# Patient Record
Sex: MALE | Race: WHITE
[De-identification: names, ages, dates, MRNs, and addresses within clinical notes are randomized per-mention and may not be internally consistent; named-entity substitution may affect disease eponyms.]

---

## 2018-10-03 ENCOUNTER — HOSPITAL ENCOUNTER (EMERGENCY)
Dept: HOSPITAL 36 - ER | Age: 38
Discharge: LEFT BEFORE DISCHARGE | End: 2018-10-03
Payer: MEDICAID

## 2018-10-03 DIAGNOSIS — F10.10: ICD-10-CM

## 2018-10-03 DIAGNOSIS — Z88.0: ICD-10-CM

## 2018-10-03 DIAGNOSIS — R04.0: Primary | ICD-10-CM

## 2018-10-03 PROCEDURE — Z7502: HCPCS

## 2018-10-03 NOTE — ED PHYSICIAN CHART
ED Chief Complaint/HPI





- Patient Information


Date Seen:: 10/03/18


Time Seen:: 03:16


Chief Complaint:: nose bleed


History of Present Illness:: 





37 yr old male with etoh abuse drank 2 fifths of vodka today and his nose 

started pouring blood now stopped pt states he was taking cyboxin and took 

himself off and started hitting the bottle again pt with chronic etoh here wife 

requesting to take him home


Allergies:: 


 Allergies











Allergy/AdvReac Type Severity Reaction Status Date / Time


 


amoxicillin Allergy   Verified 10/03/18 02:58











Vitals:: 


 Vital Signs - 8 hr











  10/03/18





  02:50


 


Temp 97.9 F


 





 


RR 18


 


/93


 


O2 Sat % 92














ED Review of Systems





- Review of Systems


General/Constitutional: No fever, No chills


Skin: Other (scab lt forehead)


Head: No headache


Eyes: No loss of vision


ENT: No earache


Neck: No neck pain


Cardio Vascular: No chest pain


Pulmonary: No SOB


GI: No nausea, No vomiting


G/U: No dysuria


Musculoskeletal: No bone or joint pain


Endocrine: No polyuria


Psychiatric: No depression


Hematopoietic: No bruising


Allergic/Immuno: No urticaria


Neurological: No syncope





ED Past Medical History





- Past Medical History


Past Medical History: Other (etoh abuse)





Family Medical History





- Family Member


  ** Mother


History Unknown: Yes





ED Physical Exam





- Physical Examination


General/Constitutional: Awake, Well-developed, well-nourished


Head: Atraumatic


Other ENMT comments:: 





dry blood both nares


Neck: Nontender


Respiratory: Nl effort/Exclusion


Cardio Vascular: RRR


GI: No tenderness/rebounding/guarding


Extremities: No tenderness or effusion


Neuro/Psych: Alert/oriented





ED Assessment





- Assessment


General Assessment: 





epistaxis





ED Septic Shock





- .


Is Septic Shock (SBP<90, OR Lactate>4 mmol\L) present?: No





- <6hrs of presentation:


Vital Signs: 


 Vital Signs - 8 hr











  10/03/18





  02:50


 


Temp 97.9 F


 





 


RR 18


 


/93


 


O2 Sat % 92














ED Reassessment (Disposition)





- Reassessment


Reassessment Condition:: Improved





- Diagnosis


Diagnosis:: 





epistaxis





- Aftercare/Follow up Instructions


Aftercare/Follow-Up Instructions:: Counseled pt regarding lab results/diagnosis 

& need follow up, Counseled pt & family regarding lab results/diagnosis & need 

follow up





- Patient Disposition


Discharge/Transfer:: Home


Condition at Disposition:: Stable

## 2018-10-05 ENCOUNTER — HOSPITAL ENCOUNTER (EMERGENCY)
Dept: HOSPITAL 36 - ER | Age: 38
Discharge: HOME | End: 2018-10-05
Payer: MEDICAID

## 2018-10-05 DIAGNOSIS — F19.10: ICD-10-CM

## 2018-10-05 DIAGNOSIS — Z88.0: ICD-10-CM

## 2018-10-05 DIAGNOSIS — R04.0: Primary | ICD-10-CM
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
## 2018-10-05 DIAGNOSIS — D69.6: ICD-10-CM

## 2018-10-05 DIAGNOSIS — F10.20: ICD-10-CM

## 2018-10-05 LAB
AMPHET UR-MCNC: NEGATIVE NG/ML
BARBITURATES UR-MCNC: NEGATIVE UG/ML
BASOPHILS # BLD AUTO: 0 TH/CUMM (ref 0–0.2)
BASOPHILS NFR BLD AUTO: 0.8 % (ref 0–2)
BENZODIAZEPINES PNL UR: POSITIVE
CANNABINOIDS SERPL QL CFM: NEGATIVE
COCAINE METAB.OTHER UR-MCNC: NEGATIVE NG/ML
EOSINOPHIL # BLD AUTO: 0 TH/CMM (ref 0.1–0.4)
EOSINOPHIL NFR BLD AUTO: 0.5 % (ref 0–5)
ERYTHROCYTE [DISTWIDTH] IN BLOOD BY AUTOMATED COUNT: 13.6 % (ref 11.5–20)
HCT VFR BLD CALC: 30 % (ref 41–60)
HGB BLD-MCNC: 10.7 GM/DL (ref 12–16)
INR PPP: 0.98 (ref 0.5–1.4)
LYMPHOCYTE AB SER FC-ACNC: 1.8 TH/CMM (ref 1.5–3)
LYMPHOCYTES NFR BLD AUTO: 42 % (ref 20–50)
MCH RBC QN AUTO: 30 PG (ref 26–30)
MCHC RBC AUTO-ENTMCNC: 35.6 PG (ref 28–36)
MCV RBC AUTO: 84.3 FL (ref 80–99)
METHADONE UR CFM-MCNC: NEGATIVE NG/ML
METHAMPHET UR QL: NEGATIVE
MONOCYTES # BLD AUTO: 0.2 TH/CMM (ref 0.3–1)
MONOCYTES NFR BLD AUTO: 4.7 % (ref 2–10)
NEUTROPHILS # BLD: 2.4 TH/CMM (ref 1.8–8)
NEUTROPHILS NFR BLD AUTO: 52 % (ref 40–80)
OPIATES UR QL: POSITIVE
PCP UR-MCNC: NEGATIVE UG/L
PLATELET # BLD: 119 TH/CMM (ref 150–400)
PMV BLD AUTO: 7.5 FL
PROTHROMBIN TIME: 10.2 SECONDS (ref 9.5–11.5)
RBC # BLD AUTO: 3.56 MIL/CMM (ref 4.3–5.7)
TRICYCLICS UR QL: NEGATIVE
WBC # BLD AUTO: 4.4 TH/CMM (ref 4.8–10.8)

## 2018-10-05 PROCEDURE — 99284 EMERGENCY DEPT VISIT MOD MDM: CPT

## 2018-10-05 PROCEDURE — 96365 THER/PROPH/DIAG IV INF INIT: CPT

## 2018-10-05 PROCEDURE — 96376 TX/PRO/DX INJ SAME DRUG ADON: CPT

## 2018-10-05 PROCEDURE — Z7502: HCPCS

## 2018-10-05 PROCEDURE — 30901 CONTROL OF NOSEBLEED: CPT

## 2018-10-05 PROCEDURE — X5958: HCPCS

## 2018-10-05 PROCEDURE — 85610 PROTHROMBIN TIME: CPT

## 2018-10-05 PROCEDURE — 80320 DRUG SCREEN QUANTALCOHOLS: CPT

## 2018-10-05 PROCEDURE — 96375 TX/PRO/DX INJ NEW DRUG ADDON: CPT

## 2018-10-05 PROCEDURE — 85025 COMPLETE CBC W/AUTO DIFF WBC: CPT

## 2018-10-05 PROCEDURE — 36415 COLL VENOUS BLD VENIPUNCTURE: CPT

## 2018-10-05 PROCEDURE — Z7610: HCPCS

## 2018-10-05 PROCEDURE — 80307 DRUG TEST PRSMV CHEM ANLYZR: CPT

## 2024-05-13 NOTE — ED PHYSICIAN CHART
ED Chief Complaint/HPI





- Patient Information


Date Seen:: 10/05/18


Time Seen:: 11:30


Chief Complaint:: nose bleed from both nares


History of Present Illness:: 





nose bleed from both nares in a known alcoholic whose coags and platelets are 

unknown.





He had forward face trauma 3 days ago and started bleeding from his nose.





CT scan was performed at [facility] with lab work.  Results of face 

and brain CT scan were normal.





Had a packing placed by Dr. Holland on a previous visit here and he had the 

nurse pull it out because it was uncomfortable.





**





Patient was checking himself into alcohol rehab today with [facility] when 

blood started coming out of his nose on both sides.


Allergies:: 


 Allergies











Allergy/AdvReac Type Severity Reaction Status Date / Time


 


amoxicillin Allergy   Verified 10/03/18 02:58











Vitals:: 


 Vital Signs - 8 hr











  10/05/18





  11:28


 


Temp 98.3 F


 





 


RR 23


 


/83


 


O2 Sat % 98











Historian:: Patient, Family Member


Review:: Nurse's Note Reviewed





ED Review of Systems





- Review of Systems


General/Constitutional: No fever, No chills, No weight loss, No weakness, No 

diaphoresis, No edema, No loss of appetite


Skin: No skin lesions, No rash, No bruising


Head: No headache, No light-headedness


Eyes: No loss of vision, No pain, No diplopia


ENT: No earache, Nasal drainage, No sore throat, Other (nose bleed (anterior 

and posterior) more on the right than on the left.)


Neck: No neck pain, No swelling, No thyromegaly, No stiffness, No mass noted


Cardio Vascular: No chest pain, No palpitations, No PND, No orthopnea, No edema


Pulmonary: No SOB, No cough, No sputum, No wheezing


GI: No nausea, No vomiting, No diarrhea, No pain, No melena, No hematochezia, 

No constipation, No hematemesis


G/U: No dysuria, No frequency, No hematuria


Psychiatric: No prior psych history, No depression, No anxiety, No suicidal 

ideation





ED Past Medical History





- Past Medical History


Obtainable: Yes


Past Medical History: Other (alcoholism.)





Family Medical History





- Family Member


  ** Mother


History Unknown: Yes





ED Physical Exam





- Physical Examination


General/Constitutional: Awake


Other Gen/Cons comments:: 


patient is bleeding from both nares (more from the right than from the left).  

he has gauze packed up his nose on both sides.





overweight.


Head: Atraumatic


Eyes: Lids, conjuctiva normal, PERRL, EOMI


Skin: Nl inspection


ENMT: External ears, nose nl


Other ENMT comments:: 


blood back in the oropharynx.





bilateral gauze present in both nares with bright red blood present on gauze.  

dripping from right nares after the gauze was pulled.


Neck: Nontender, Full ROM w/o pain, No nuchal rigidity, No stridor


Respiratory: Nl effort/Exclusion


Cardio Vascular: No murmur, gallop, rubs, NL S1 S2


Other Cardio Vascular comments:: 


tachycardia.


GI: No tenderness/rebounding/guarding, No organomegaly, No hernia, Normal BS's, 

Nondistended, No mass/bruits, No McBurney tenderness


: No CVA tenderness


Extremities: No tenderness or effusion, Full ROM, normal strength in all 

extremities, No edema, Normal digits & nails


Neuro/Psych: Alert/oriented, Normal sensory exam, Normal motor strength


Other Neuro/Psych comments:: 


fine tremor present.  given Ativan for this.  Patient also took oral Ativan and 

drank alcohol hours before.


Misc: Normal back, No paraspinal tenderness





ED Assessment





- Assessment


General Assessment: 


patient was observed for hours after the rhino rockets were placed.  no further 

bleeding from either nares and no bleeding in the back of his oropharynx.





the balloons were taped to his cheeks after his skin was degreased with alcohol 

pads and tincture of benzoin was placed.  A drip sponge was placed underneath 

his nares.  





Wife was shown how to reinforce the tape and how to make a new drip sponge.


Assessment/Comments:: 


wife came in to the ER to take patient home.  She will be in charge of his 

medications for him.





They both work in Mental Health and she doesn't need a work note to take care 

of him.





- Procedures


Procedures:: 


place of anterior and posterior nasal packing (rhino rocket) to the right nares 

and to the left nares after anesthesia application with cetacaine spray.  The 

rhino rockets were saturated with triple antibiotic ointment prior to being 

placed.





ED Septic Shock





- .


Is Septic Shock (SBP<90, OR Lactate>4 mmol\L) present?: No





- <6hrs of presentation:


Vital Signs: 


 Vital Signs - 8 hr











  10/05/18





  11:28


 


Temp 98.3 F


 





 


RR 23


 


/83


 


O2 Sat % 98














ED Reassessment (Disposition)





- Reassessment


Reassessment Condition:: Improved





- Diagnosis


Diagnosis:: 


Nosebleed, resolved.





Alcoholism





Polysubstance abuse





Thrombocytopenia.





- Aftercare/Follow up Instructions


Aftercare/Follow-Up Instructions:: Refer to Discharge Instructions


Notes:: 


follow up with an ENT to have the packing removed this Monday or with an ER 

that has an ENT on staff.





Take the Clindamycin to prevent a sinus infection.





Take the Ativan to prevent withdrawal from alcohol.





Follow up with your plans to go for alcohol rehab.





Prevent bending over at the waist.





Prevent coughing.





Prevent vomiting.


Medication Prescribed:: 


Ativan taper for 3 days.





Clindamycin





Zofran





- Patient Disposition


Discharge/Transfer:: Home


Condition at Disposition:: Stable, Improved Need for prophylactic measure